# Patient Record
Sex: MALE | Race: WHITE | NOT HISPANIC OR LATINO | URBAN - METROPOLITAN AREA
[De-identification: names, ages, dates, MRNs, and addresses within clinical notes are randomized per-mention and may not be internally consistent; named-entity substitution may affect disease eponyms.]

---

## 2021-12-24 ENCOUNTER — EMERGENCY (EMERGENCY)
Facility: HOSPITAL | Age: 46
LOS: 1 days | Discharge: HOME | End: 2021-12-24
Payer: COMMERCIAL

## 2021-12-24 VITALS
OXYGEN SATURATION: 96 % | HEART RATE: 78 BPM | TEMPERATURE: 98 F | SYSTOLIC BLOOD PRESSURE: 155 MMHG | DIASTOLIC BLOOD PRESSURE: 104 MMHG | RESPIRATION RATE: 18 BRPM

## 2021-12-24 PROCEDURE — 99282 EMERGENCY DEPT VISIT SF MDM: CPT

## 2021-12-25 VITALS
TEMPERATURE: 98 F | DIASTOLIC BLOOD PRESSURE: 100 MMHG | HEART RATE: 72 BPM | SYSTOLIC BLOOD PRESSURE: 187 MMHG | OXYGEN SATURATION: 96 % | RESPIRATION RATE: 18 BRPM

## 2021-12-25 LAB — SARS-COV-2 RNA SPEC QL NAA+PROBE: SIGNIFICANT CHANGE UP

## 2021-12-25 PROCEDURE — U0003: CPT

## 2021-12-25 PROCEDURE — U0005: CPT

## 2021-12-25 PROCEDURE — 99283 EMERGENCY DEPT VISIT LOW MDM: CPT

## 2021-12-25 NOTE — ED PROVIDER NOTE - PHYSICAL EXAMINATION
Const: AOX3 nontoxic appearing, no apparent respiratory or physical distress. Stable gait   HEENT: NC/AT. Moist mucous membranes.  Eyes: ALLISON. EOMI  Neck: Soft and supple. Full ROM without pain.  Resp: Speaking in full sentences. No evidence of respiratory distress  Skin: No rashes, abrasions or lacerations.  Lymph: No cervical lymphadenopathy.  Neuro: Awake, alert & oriented x 3. Moves all extremities symmetrically.

## 2021-12-25 NOTE — ED PROVIDER NOTE - CLINICAL SUMMARY MEDICAL DECISION MAKING FREE TEXT BOX
Pt nontoxic appearing, stable vitals, ambulatory with stable saturation without supplemental oxygen. PT does not meet criteria listed in most updated guidelines as per [facility] protocol/algorithm for admission at this time. pt advised about self-quarantine instructions until negative test results and/or symptom resolution. pt advised on hand hygiene, monitoring of symptoms, antipyretic use as well as and fu with primary care provider. Instructions given in pre-printed copy.  pt is been told has elevated BP asymptomatic  f.u pcp asap pt agreed will dc

## 2021-12-25 NOTE — ED PROVIDER NOTE - OBJECTIVE STATEMENT
COVID ASYMPTOMATIC SWAB  Pt presenting to the ER for COVID-19 testing. Denies fevers chills, loss of taste or smell, URI symptoms, chest pain or shortness of breath, nausea vomiting diarrhea abdominal pain, weakness or fatigue. Eating and drinking normal diet. Normal output. Pt requesting testing at this time. [x] known exposure [] no-known exposure [] travel [] no travel [ ] smoker [ ] non-smoker  COVID SYMPTOMATIC SWAB        pt son tested + denies any symptoms , no hx of BP not on any med. pt states he is a lot nervious due to the situation

## 2021-12-25 NOTE — ED PROVIDER NOTE - PATIENT PORTAL LINK FT
You can access the FollowMyHealth Patient Portal offered by [facility] by registering at the following website: [email or web address]. By joining [employer]’s FollowMyHealth portal, you will also be able to view your health information using other applications (apps) compatible with our system.